# Patient Record
Sex: FEMALE | Race: WHITE
[De-identification: names, ages, dates, MRNs, and addresses within clinical notes are randomized per-mention and may not be internally consistent; named-entity substitution may affect disease eponyms.]

---

## 2014-05-01 VITALS — SYSTOLIC BLOOD PRESSURE: 154 MMHG | DIASTOLIC BLOOD PRESSURE: 69 MMHG

## 2018-04-04 ENCOUNTER — HOSPITAL ENCOUNTER (OUTPATIENT)
Dept: HOSPITAL 44 - LAB | Age: 63
End: 2018-04-04
Attending: PHYSICIAN ASSISTANT
Payer: MEDICARE

## 2018-04-04 DIAGNOSIS — R50.9: ICD-10-CM

## 2018-04-04 DIAGNOSIS — R05: Primary | ICD-10-CM

## 2018-04-04 LAB
BASOPHILS NFR BLD: 0.9 % (ref 0–1.5)
EOSINOPHIL NFR BLD: 2.9 % (ref 0–6.8)
MCH RBC QN AUTO: 30.3 PG (ref 28–34)
MCV RBC AUTO: 91.3 FL (ref 80–100)
MONOCYTES %: 5.5 % (ref 0–11)
NEUTROPHILS #: 4.1 # K/UL (ref 1.4–7.7)

## 2018-04-04 PROCEDURE — 36415 COLL VENOUS BLD VENIPUNCTURE: CPT

## 2018-04-04 PROCEDURE — 85025 COMPLETE CBC W/AUTO DIFF WBC: CPT

## 2018-04-04 PROCEDURE — 71046 X-RAY EXAM CHEST 2 VIEWS: CPT

## 2018-04-04 NOTE — DIAGNOSTIC IMAGING REPORT
APRIL VIDAL 

[facility]

[address] [facility] [address] [address]

 

 

 

 

Report Submission Date: 2018 2:23:27 PM CDT

Patient       Study

Name:   NEO JORGE       Date:   2018 1:53:41 PM CDT

MRN:   [id number]       Modality Type:   DX

Gender:   F       Description:   CHEST

:   53       Institution:   [facility]

Physician:   APRIL VIDAL

     Accession:    [id number]

 

 

Examination: PA and lateral chest. 



History: Evaluate lung fields. 



Comparison exam: None provided. 



Findings: PA lateral chest demonstrate a normal cardiac and mediastinal 
silhouette. Elevated right hemidiaphragm. No focal infiltrate.  No blunting of 
the costophrenic margins.  Osseous structures are appropriate for age. 



Impression: No acute pulmonary process.

 

Electronically signed on 2018 2:23:27 PM CDT by:

Tremayne GONZALEZ